# Patient Record
Sex: MALE | URBAN - METROPOLITAN AREA
[De-identification: names, ages, dates, MRNs, and addresses within clinical notes are randomized per-mention and may not be internally consistent; named-entity substitution may affect disease eponyms.]

---

## 2018-11-28 ENCOUNTER — OFFICE VISIT (OUTPATIENT)
Dept: INTERNAL MEDICINE | Facility: CLINIC | Age: 23
End: 2018-11-28
Payer: COMMERCIAL

## 2018-11-28 VITALS
WEIGHT: 150.9 LBS | OXYGEN SATURATION: 96 % | SYSTOLIC BLOOD PRESSURE: 126 MMHG | HEIGHT: 71 IN | BODY MASS INDEX: 21.13 KG/M2 | DIASTOLIC BLOOD PRESSURE: 80 MMHG | HEART RATE: 71 BPM

## 2018-11-28 DIAGNOSIS — Z00.00 ENCOUNTER FOR ROUTINE HISTORY AND PHYSICAL EXAM FOR MALE: Primary | ICD-10-CM

## 2018-11-28 ASSESSMENT — PAIN SCALES - GENERAL: PAINLEVEL: NO PAIN (0)

## 2018-11-28 NOTE — NURSING NOTE
Chief Complaint   Patient presents with     Establish Care     here to establish care     Physical     here for annual physical       Isaias Redding, EMT 1:14 PM on 11/28/2018.

## 2018-11-28 NOTE — MR AVS SNAPSHOT
After Visit Summary   11/28/2018    Liborio Gan    MRN: [id number]           Patient Information     Date Of Birth          1995        Visit Information        Provider Department      11/28/2018 1:10 PM Tong Frederick MD [facility]        Today's Diagnoses     Encounter for routine history and physical exam for male    -  1      Care Instructions    Primary Care Center Medication Refill Request Information:  * Please contact your pharmacy regarding ANY request for medication refills.  ** [facility] Prescription Fax = [phone]  * Please allow 3 business days for routine medication refills.  * Please allow 5 business days for controlled substance medication refills.     Primary Care Center Test Result notification information:  *You will be notified with in 7-10 days of your appointment day regarding the results of your test.  If you are on MyChart you will be notified as soon as the provider has reviewed the results and signed off on them.    [facility]: [phone]     Fainting: Vagal Reaction  Fainting (syncope) is a temporary loss of consciousness that is associated with a loss of postural tone. It s also called passing out. It occurs when blood flow to the brain is less than normal. Your healthcare provider believes that your fainting was because of a vagal reaction. This condition is not a sign of serious disease.  A vagal reaction is a response in your body that causes your pulse to slow down or the blood vessels to expand. This causes your blood pressure to fall. And this sends less blood to your brain if you are standing or sitting. That results in dizziness, near-fainting, or fainting. Lying down usually stops the reaction within 60 seconds.  This response can occur during sudden fear, severe pain, emotional stress, overexertion, overheating, hunger, nausea or vomiting, prolonged standing, or standing up after sitting or lying for a long time.  Home  care  Follow these guidelines when caring for yourself at home:    Rest today. Go back to your normal activities as soon as you are feeling back to normal.    Stay hydrated and avoid skipping meals.    If you feel lightheaded or dizzy, lie down right away. Or sit with your head lowered between your knees.  Follow-up care  Follow up with your healthcare provider, or as advised.  Call 911  Call 911 if any of the following occur:    Another fainting spell that s not explained by the common causes listed above    Pain in your chest, arm, neck, jaw, back, or abdomen    Shortness of breath    Severe headache or seizure    Your heart beats very rapidly, very slowly, or irregularly (palpitations)  Date Last Reviewed: 12/1/2016 2000-2018 [email or web address]. [address] [place] [address]. All rights reserved. This information is not intended as a substitute for professional medical care. Always follow your healthcare professional's instructions.                Follow-ups after your visit        Future tests that were ordered for you today     Open Future Orders        Priority Expected Expires Ordered    Basic metabolic panel Routine 12/7/2018 3/28/2019 11/28/2018    Lipid Profile Routine  11/28/2019 11/28/2018    Vitamin D Deficiency Routine 11/28/2018 11/28/2019 11/28/2018    CBC with platelets Routine  11/28/2019 11/28/2018            Who to contact     Please call your clinic at [phone] to:    Ask questions about your health    Make or cancel appointments    Discuss your medicines    Learn about your test results    Speak to your doctor            Additional Information About Your Visit        Mobi Rider Information     [employer] gives you secure access to your electronic health record. If you see a primary care provider, you can also send messages to your care team and make appointments. If you have questions, please call your primary care clinic.  If you do not have a primary care provider, please  "call [phone] and they will assist you.      brotips is an electronic gateway that provides easy, online access to your medical records. With brotips, you can request a clinic appointment, read your test results, renew a prescription or communicate with your care team.     To access your existing account, please contact your [facility] or call [phone] for assistance.        Care EveryWhere ID     This is your Care EveryWhere ID. This could be used by other organizations to access your [place] medical records  [id number]        Your Vitals Were     Pulse Height Pulse Oximetry BMI (Body Mass Index)          71 1.815 m (5' 11.46\") 96% 20.78 kg/m2         Blood Pressure from Last 3 Encounters:   11/28/18 126/80    Weight from Last 3 Encounters:   11/28/18 68.4 kg (150 lb 14.4 oz)               Primary Care Provider    None Specified       No primary provider on file.        Equal Access to Services     WENDY NIELSEN : Hadii nghia engo Sograham, waaxda luqadaha, qaybta kaalmada adekerenyada, veronika fernandez . So [facility] [phone].    ATENCIÓN: Si habla español, tiene a dominguez disposición servicios gratuitos de asistencia lingüística. Tracydavina al [phone].    We comply with applicable federal civil rights laws and [place] laws. We do not discriminate on the basis of race, color, national origin, age, disability, sex, sexual orientation, or gender identity.            Thank you!     Thank you for choosing [facility]  for your care. Our goal is always to provide you with excellent care. Hearing back from our patients is one way we can continue to improve our services. Please take a few minutes to complete the written survey that you may receive in the mail after your visit with us. Thank you!             Your Updated Medication List - Protect others around you: Learn how to safely use, store and throw away your medicines at " www.disposemymeds.org.      Notice  As of 11/28/2018  2:22 PM    You have not been prescribed any medications.

## 2018-11-28 NOTE — PROGRESS NOTES
[facility]  Primary Care Center   Tong Frederick MD  11/28/2018      Chief Complaint:   Establish Care and Physical       History of Present Illness:   Liborio Gan is a healthy 23 year old male who presents for a physical and to establish care. He has not had a recent physical and has not established a primary care physician. He has spring time allergies with sinus problems which he treats with Zyrtec PRN. He also gets some pressure in his ears, ear tubes were considered but they did not place them. He has to pop his ears occasionally because his eustachian tubes are closed off. Some occasional bowel urgency, dairy tends to upset his stomach lately. He stopped eating cheese and drinking milk. No blood in stool or urine. [place] and [place] last summer. He also went to [place] recently.     There is a wart on his thumb that has been smaller than usual. No other moles or rashes. No numbness in hands and fingertips. He felt a significant change in humidity when going into and out of the room.     While in a [employer] [place] he went into a dry room, after coming out he had tunnel vision and fell, hitting his head. He went to the ED and did not have a concussion.     While running he kicks his foot in and his ankle seizes up. This has been worsening recently, he noticed it more in the last year and a half and it makes long distance running more difficult. He does 20-30 minutes of cardio 3x per week and an hour of strength training twice a week.      Recently screened for STIs, all clear. He does not consistently wear protection during intercourse.     Does not snore and sees dentist.      Healthcare/Maintenance topics discussed:  Lipid panel: every 5 years, yearly if risk factors - Recommended  GC/Chlamydia (sexually active females age <24 years): yearly - Up to Date   HIV (ages 13 - 64): once per lifetime, yearly if MSM or other risk factors - Up to Date  Immunizations (Tetanus every 10 years,  Influenza yearly, Pneumonia PPV-23 and PCV-13 age ? 65 or sooner if risk factors) - Up to Date   Immunization History   Administered Date(s) Administered     HPV 2018     Influenza Vaccine IM 3yrs+ 4 Valent IIV4 10/05/2018     TDAP Vaccine (Boostrix) 2016     The following health maintenance issues were also reviewed and addressed as needed:  Blood pressure (>18 years annually)  Depression - PHQ-2 Score:   PHQ-2 (  [employer]) 2018   Q1: Little interest or pleasure in doing things 0   Q2: Feeling down, depressed or hopeless 1   PHQ-2 Score 1   Q1: Little interest or pleasure in doing things Not at all   Q2: Feeling down, depressed or hopeless Several days   PHQ-2 Score 1    Lifestyle habits (including exercise, diet, weight)  Health risk behaviors (sexual history, alcohol, tobacco, drug use, partner violence)  Routine eye, dental, hearing, and skin exams  Wears helmet when he bikes  No firearms    Review of Systems:   Pertinent items are noted in HPI or as in patient entered ROS below, remainder of complete ROS is negative.   Answers for HPI/ROS submitted by the patient on 2018   General Symptoms: No  Skin Symptoms: No, other than wart on finger  HENT Symptoms: No  EYE SYMPTOMS: No  HEART SYMPTOMS: No  LUNG SYMPTOMS: No  INTESTINAL SYMPTOMS: sensitivity to dairy and occasional urgency, otherwise no symptoms  URINARY SYMPTOMS: No  REPRODUCTIVE SYMPTOMS: No  SKELETAL SYMPTOMS: No  BLOOD SYMPTOMS: No  NERVOUS SYSTEM SYMPTOMS: No  MENTAL HEALTH SYMPTOMS: No      Active Medications:   No current outpatient prescriptions on file.      Allergies:   Review of patient's allergies indicates no known allergies.      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 Lived in [place] for a while.  Occasional alcohol, 1-2 drinks usually wine  Male/female partners, does not use condoms consistently.    Physical Exam:   /80 (BP Location: Right arm, Patient Position: Sitting, Cuff Size: Adult Regular)  Pulse 71  Ht 1.815 m (5' 11.46\")  Wt 68.4 kg (150 lb 14.4 oz)  SpO2 96%  BMI 20.78 kg/m2   GENERAL APPEARANCE: Healthy, alert and no distress  EYES: EOMI, PERRL  HENT: Ear canals and TM's normal. Nose and mouth without ulcers or lesions  NECK: No adenopathy, no asymmetry, masses, or scars. Thyroid normal to palpation  RESP: Lungs clear to auscultation and percussion - no rales, rhonchi or wheezes  CV: Regular rates and rhythm, normal S1 and S2, no S3 or S4 and no murmur, click or rub  ABDOMEN:  Soft, nontender, no HSM or masses and bowel sounds normal  MS: Extremities normal - no gross deformities noted. Good flexibility.  NEURO: Normal strength and tone, mentation intact and speech normal  PSYCH: Mentation appears normal and affect normal/bright   (male): normal male genitalia without lesions or urethral discharge, no hernia  SKIN: wart on left thumb volar finger, scar above right eye from childhood, otherwise no suspicious rashes/lesions    Assessment and Plan:    Encounter for routine history and physical exam for male  Overall healthy, since he already has healthy habits he was encouraged to continue as is. Recommended protection during intercourse, increasing his calcium intake. For the wart on his thumb he can try OTC treatment.   -     Basic metabolic panel; Future  -     Lipid Profile; Future  -     Vitamin D Deficiency; Future  -     CBC with platelets; Future   Immunizations up to date  Discussed surveillance STI screening in the future if he has unprotected intercourse.     Follow-up: Recommended " three year follow ups with physicals or PRN for acute illness     Scribe Disclosure:   I, Elvin Castillo, am serving as a scribe to document services personally performed by Tong Frederick MD at this visit, based upon the provider's statements to me. All documentation has been reviewed by the aforementioned provider prior to being entered into the official medical record.     Portions of this medical record were completed by a scribe. UPON MY REVIEW AND AUTHENTICATION BY ELECTRONIC SIGNATURE, this confirms (a) I performed the applicable clinical services, and (b) the record is accurate.     Tong Frederick MD, A

## 2018-11-28 NOTE — PATIENT INSTRUCTIONS
[facility] Medication Refill Request Information:  * Please contact your pharmacy regarding ANY request for medication refills.  ** [facility] Prescription Fax = [phone]  * Please allow 3 business days for routine medication refills.  * Please allow 5 business days for controlled substance medication refills.     [facility] Test Result notification information:  *You will be notified with in 7-10 days of your appointment day regarding the results of your test.  If you are on MyChart you will be notified as soon as the provider has reviewed the results and signed off on them.    [facility]: [phone]     Fainting: Vagal Reaction  Fainting (syncope) is a temporary loss of consciousness that is associated with a loss of postural tone. It s also called passing out. It occurs when blood flow to the brain is less than normal. Your healthcare provider believes that your fainting was because of a vagal reaction. This condition is not a sign of serious disease.  A vagal reaction is a response in your body that causes your pulse to slow down or the blood vessels to expand. This causes your blood pressure to fall. And this sends less blood to your brain if you are standing or sitting. That results in dizziness, near-fainting, or fainting. Lying down usually stops the reaction within 60 seconds.  This response can occur during sudden fear, severe pain, emotional stress, overexertion, overheating, hunger, nausea or vomiting, prolonged standing, or standing up after sitting or lying for a long time.  Home care  Follow these guidelines when caring for yourself at home:    Rest today. Go back to your normal activities as soon as you are feeling back to normal.    Stay hydrated and avoid skipping meals.    If you feel lightheaded or dizzy, lie down right away. Or sit with your head lowered between your knees.  Follow-up care  Follow up with your healthcare provider, or as advised.  Call 911  Call 911 if  any of the following occur:    Another fainting spell that s not explained by the common causes listed above    Pain in your chest, arm, neck, jaw, back, or abdomen    Shortness of breath    Severe headache or seizure    Your heart beats very rapidly, very slowly, or irregularly (palpitations)  Date Last Reviewed: 12/1/2016 2000-2018 [employer]. [address] [place] [address]. All rights reserved. This information is not intended as a substitute for professional medical care. Always follow your healthcare professional's instructions.

## 2018-11-30 DIAGNOSIS — Z00.00 ENCOUNTER FOR ROUTINE HISTORY AND PHYSICAL EXAM FOR MALE: ICD-10-CM

## 2018-11-30 LAB
ANION GAP SERPL CALCULATED.3IONS-SCNC: 5 MMOL/L (ref 3–14)
BUN SERPL-MCNC: 17 MG/DL (ref 7–30)
CALCIUM SERPL-MCNC: 8.6 MG/DL (ref 8.5–10.1)
CHLORIDE SERPL-SCNC: 102 MMOL/L (ref 94–109)
CHOLEST SERPL-MCNC: 114 MG/DL
CO2 SERPL-SCNC: 29 MMOL/L (ref 20–32)
CREAT SERPL-MCNC: 0.83 MG/DL (ref 0.66–1.25)
DEPRECATED CALCIDIOL+CALCIFEROL SERPL-MC: 25 UG/L (ref 20–75)
ERYTHROCYTE [DISTWIDTH] IN BLOOD BY AUTOMATED COUNT: 11.9 % (ref 10–15)
GFR SERPL CREATININE-BSD FRML MDRD: >90 ML/MIN/1.7M2
GLUCOSE SERPL-MCNC: 80 MG/DL (ref 70–99)
HCT VFR BLD AUTO: 46.4 % (ref 40–53)
HDLC SERPL-MCNC: 49 MG/DL
HGB BLD-MCNC: 15.8 G/DL (ref 13.3–17.7)
LDLC SERPL CALC-MCNC: 52 MG/DL
MCH RBC QN AUTO: 29.3 PG (ref 26.5–33)
MCHC RBC AUTO-ENTMCNC: 34.1 G/DL (ref 31.5–36.5)
MCV RBC AUTO: 86 FL (ref 78–100)
NONHDLC SERPL-MCNC: 65 MG/DL
PLATELET # BLD AUTO: 150 10E9/L (ref 150–450)
POTASSIUM SERPL-SCNC: 3.7 MMOL/L (ref 3.4–5.3)
RBC # BLD AUTO: 5.4 10E12/L (ref 4.4–5.9)
SODIUM SERPL-SCNC: 137 MMOL/L (ref 133–144)
TRIGL SERPL-MCNC: 68 MG/DL
WBC # BLD AUTO: 4.3 10E9/L (ref 4–11)

## 2020-03-11 ENCOUNTER — HEALTH MAINTENANCE LETTER (OUTPATIENT)
Age: 25
End: 2020-03-11

## 2021-01-03 ENCOUNTER — HEALTH MAINTENANCE LETTER (OUTPATIENT)
Age: 26
End: 2021-01-03

## 2021-04-25 ENCOUNTER — HEALTH MAINTENANCE LETTER (OUTPATIENT)
Age: 26
End: 2021-04-25

## 2021-09-08 ENCOUNTER — MEDICAL CORRESPONDENCE (OUTPATIENT)
Dept: HEALTH INFORMATION MANAGEMENT | Facility: CLINIC | Age: 26
End: 2021-09-08

## 2021-09-08 ENCOUNTER — TRANSFERRED RECORDS (OUTPATIENT)
Dept: HEALTH INFORMATION MANAGEMENT | Facility: CLINIC | Age: 26
End: 2021-09-08

## 2021-09-08 LAB
CREATININE (EXTERNAL): 0.75 MG/DL (ref 0.7–1.3)
GFR ESTIMATED (EXTERNAL): >60 ML/MIN/1.73M^2
GFR ESTIMATED (IF AFRICAN AMERICAN) (EXTERNAL): >60 ML/MIN/1.73M^2
GLUCOSE (EXTERNAL): 94 MG/DL (ref 70–124)
POTASSIUM (EXTERNAL): 4.6 MMOL/L (ref 3.4–5.3)
TSH SERPL-ACNC: 1.84 MLU/L (ref 0.4–4.5)

## 2021-09-10 ENCOUNTER — ANCILLARY PROCEDURE (OUTPATIENT)
Dept: CARDIOLOGY | Facility: CLINIC | Age: 26
End: 2021-09-10
Attending: FAMILY MEDICINE
Payer: COMMERCIAL

## 2021-09-10 DIAGNOSIS — R42 INTERMITTENT LIGHTHEADEDNESS: ICD-10-CM

## 2021-09-10 DIAGNOSIS — Z82.49 FAMILY HISTORY OF WOLFF-PARKINSON-WHITE (WPW) SYNDROME: ICD-10-CM

## 2021-09-10 DIAGNOSIS — R00.2 PALPITATIONS: ICD-10-CM

## 2021-09-10 PROCEDURE — 93248 EXT ECG>7D<15D REV&INTERPJ: CPT | Performed by: INTERNAL MEDICINE

## 2021-09-10 PROCEDURE — 93246 EXT ECG>7D<15D RECORDING: CPT

## 2021-09-10 NOTE — PROGRESS NOTES
Per Dr. Barahona, patient to have Zio monitor placed.  Diagnosis: palpitations  Monitor placed: Yes  Patient Instructed: Yes  Patient verbalized understanding: Yes  Holter # [id number]

## 2021-09-14 ENCOUNTER — MEDICAL CORRESPONDENCE (OUTPATIENT)
Dept: HEALTH INFORMATION MANAGEMENT | Facility: CLINIC | Age: 26
End: 2021-09-14

## 2021-10-10 ENCOUNTER — HEALTH MAINTENANCE LETTER (OUTPATIENT)
Age: 26
End: 2021-10-10

## 2022-05-21 ENCOUNTER — HEALTH MAINTENANCE LETTER (OUTPATIENT)
Age: 27
End: 2022-05-21

## 2022-09-18 ENCOUNTER — HEALTH MAINTENANCE LETTER (OUTPATIENT)
Age: 27
End: 2022-09-18

## 2023-06-04 ENCOUNTER — HEALTH MAINTENANCE LETTER (OUTPATIENT)
Age: 28
End: 2023-06-04

## 2024-07-14 ENCOUNTER — HEALTH MAINTENANCE LETTER (OUTPATIENT)
Age: 29
End: 2024-07-14